# Patient Record
Sex: MALE | Race: WHITE | NOT HISPANIC OR LATINO | Employment: OTHER | URBAN - METROPOLITAN AREA
[De-identification: names, ages, dates, MRNs, and addresses within clinical notes are randomized per-mention and may not be internally consistent; named-entity substitution may affect disease eponyms.]

---

## 2019-08-23 ENCOUNTER — HOSPITAL ENCOUNTER (OUTPATIENT)
Dept: RADIOLOGY | Facility: MEDICAL CENTER | Age: 59
End: 2019-08-23
Attending: NEUROLOGICAL SURGERY
Payer: COMMERCIAL

## 2019-08-23 DIAGNOSIS — M21.379 FOOT-DROP, UNSPECIFIED LATERALITY: ICD-10-CM

## 2019-08-23 DIAGNOSIS — M54.5 LOW BACK PAIN, UNSPECIFIED BACK PAIN LATERALITY, UNSPECIFIED CHRONICITY, WITH SCIATICA PRESENCE UNSPECIFIED: ICD-10-CM

## 2019-08-23 DIAGNOSIS — M54.2 CERVICALGIA: ICD-10-CM

## 2019-08-23 PROCEDURE — 72148 MRI LUMBAR SPINE W/O DYE: CPT

## 2019-08-23 PROCEDURE — 72141 MRI NECK SPINE W/O DYE: CPT

## 2022-09-30 ENCOUNTER — NON-PROVIDER VISIT (OUTPATIENT)
Dept: NEUROLOGY | Facility: MEDICAL CENTER | Age: 62
End: 2022-09-30
Attending: SPECIALIST
Payer: COMMERCIAL

## 2022-09-30 DIAGNOSIS — M62.81 MUSCLE WEAKNESS (GENERALIZED): ICD-10-CM

## 2022-09-30 PROCEDURE — 95886 MUSC TEST DONE W/N TEST COMP: CPT | Performed by: SPECIALIST

## 2022-09-30 PROCEDURE — 95913 NRV CNDJ TEST 13/> STUDIES: CPT | Performed by: SPECIALIST

## 2022-09-30 PROCEDURE — 95913 NRV CNDJ TEST 13/> STUDIES: CPT | Mod: 26 | Performed by: SPECIALIST

## 2022-09-30 PROCEDURE — 95886 MUSC TEST DONE W/N TEST COMP: CPT | Mod: 26 | Performed by: SPECIALIST

## 2022-09-30 NOTE — PROCEDURES
NERVE CONDUCTION STUDIES AND ELECTROMYOGRAPHY REPORT        09/30/22      Referring provider: No primary care provider on file.      SUMMARY OF PATIENT'S CLINICAL HISTORY,PHYSICAL EXAM, AND RATIONALE FOR TESTING:    Mr. Saurav Story Byse 62 y.o. presenting with longstanding history of diabetes and a several year history of weakness in the legs than weakness in the arms associated with decreased sensation.    Past Medical History is significant for : No past medical history on file.    A brief general examination was remarkable for bilateral upper extremity intrinsic hand muscle weakness and atrophy, absent ankle and toe dorsi and plantar flexion bilaterally.  The patient had significant atrophy in his intrinsic foot muscles bilaterally.    The electrodiagnostic studies were performed to evaluate for possible neuropathy versus radiculopathy.      ELECTRODIAGNOSTIC EXAMINATION:  Nerve conduction studies (NCS) and electromyography (EMG) are utilized to evaluate direct or indirect damage to the peripheral nervous system. NCS are performed to measure the nerve(s) response(s) to electrostimulation across a given nerve segment. EMG evaluates the passive and active electrical activity of the muscle(s) in question.  Muscles are innervated by specific peripheral nerves and roots. Often times, several nerves the muscle to be examined in order to determine the presence or absence of the disease process. Furthermore, nerves and muscles may need to be tested in a [id number] comparison, as well as in additional extremities, as this may be crucial in characterizing the extent of the disease process, which may be diffuse or isolated and of varying degree of severity. The extent of the neurodiagnostic exam is justified as it may help arrive to a proper diagnosis, which ultimately may contribute to better management of the patient. Therefore, the nerves to muscles examined during the study were medically necessary.    Unless  otherwise noted, temperature of the extremity(s) study was monitored before and during the examination and remained between 32 and 36 degrees C for the upper extremities, and between 30 and 36 degrees C for the lower extremities.      NERVE CONDUCTION STUDIES:  Sensory nerves:   - Bilateral Median sensory nerves were examined.The responses were not elicitable with antidromic stimuli bilaterally.  - Bilateral Ulnar sensory nerves were examined. The responses were not elicitable with antidromic stimuli bilaterally.  - Bilateral Sural nerves were tested. The responses were not elicitable with antidromic stimuli bilaterally.  -Right radial sensory nerve was studied.  The response was abnormal.  Onset latency was within normal limits but amplitude was decreased to 6.2 µV and conduction velocity was decreased to 33 m/s.    Motor nerves:   - Bilateral Median motor nerves were examined. Recording electrodes placed at the Abductor Pollicis Brevis muscles. The responses were abnormal bilaterally.  The response on the right exhibited a prolonged onset latency at 4.1 ms decreased amplitude of 4.3 mV and slowed conduction velocity at 39 m/s, the response on the right exhibited a prolonged onset latency at 5.5 ms, decreased amplitude at 1.9 mV and slowed conduction velocity at 40 m/s.  - Bilateral Ulnar motor nerves were examined. Recording electrodes placed at the Abductor Digiti Minimi muscles. The responses were abnormal bilaterally.  The response on the left exhibited a prolonged onset latency at 3.4 ms, decreased amplitude of 0.9 mV and slowed conduction velocity at 29 m/s, the response on the right exhibited a normal onset latency with a decreased amplitude of 1.8 mV and slowed conduction velocity of 35 m/s.  - Bilateral Tibial nerves were examined. Recording electrodes placed at the Abductor Hallucis muscles. The responses were not elicitable with proximal or distal stimuli bilaterally.  - Bilateral Deep Peroneal motor  nerves were examined. Recording electrodes were placed at the Extensor Digitorum Brevis muscles.The responses were not elicitable with proximal or distal stimuli bilaterally.  -Bilateral common peroneal motor nerves were examined.  Recording electrodes were placed at the tibialis anterior muscles.  The responses were not elicitable with proximal or distal stimuli bilaterally.        LATE RESPONSES:  F waves were obtained and the following nerves: Left median.  The response was not elicitable.        ELECTROMYOGRAPHY:  The study was performed the concentric needle electrode. Fibrillation and fasciculation activity is graded by convention from none (0) to continuous (4+).  Needle electrode examination was performed in the following muscles: Bilateral deltoid, biceps, triceps, first dorsal interosseous, abductor pollicis brevis.  Acute denervation changes with increased insertional activity and fibrillation potentials were noted in the left tibialis anterior muscle and in the right first dorsal interosseous and abductor pollicis brevis muscle and in the left first dorsal interosseous muscle.  Chronic neuropathic changes with decreased recruitment in many cases of increased amplitude potentials were noted in the vastus lateralis muscles bilaterally.  No recruitable motor units were noted in the bilateral tibialis anterior, gastrocnemius, extensor digitorum brevis, and abductor hallucis muscles.  Reduced recruitment of increased amplitude potentials were noted in the bilateral first dorsal interosseous and abductor pollicis brevis muscles.        Nerve Conduction Studies     Stim Site NR Onset (ms) Norm Onset (ms) O-P Amp (µV) Norm O-P Amp Site1 Site2 Delta-P (ms) Dist (cm) Miguel (m/s) Norm Miguel (m/s)   Left Median Anti Sensory (2nd Digit)   Wrist *NR  <3.8  >10 Wrist 2nd Digit  14.0  >50   Right Median Anti Sensory (2nd Digit)   Wrist *NR  <3.8  >10 Wrist 2nd Digit  14.0  >50   Right Radial Anti Sensory (Base 1st Digit)    Wrist    2.1 <2.8 *6.2 >10 Wrist Base 1st Digit 3.0 10.0 *33 >50       2.7  3.4          Left Sural Anti Sensory (Lat Mall)   Calf *NR  <4.6  >3 Calf Lat Mall  14.0  >40   Right Sural Anti Sensory (Lat Mall)   Calf *NR  <4.6  >3 Calf Lat Mall  14.0  >40   Left Ulnar Anti Sensory (5th Digit)   Wrist *NR  <3.2  >5 Wrist 5th Digit  14.0  >50   Right Ulnar Anti Sensory (5th Digit)   Wrist *NR  <3.2  >5 Wrist 5th Digit  14.0  >50        Stim Site NR Onset (ms) Norm Onset (ms) O-P Amp (mV) Norm O-P Amp Site1 Site2 Delta-0 (ms) Dist (cm) Miguel (m/s) Norm Miguel (m/s)   Left Median Motor (Abd Poll Brev)   Wrist    *4.1 <4 *4.3 >5 Elbow Wrist 6.8 26.5 *39 >50   Elbow    10.9  2.8          Right Median Motor (Abd Poll Brev)   Wrist    *5.5 <4 *1.9 >5 Elbow Wrist 7.2 28.5 *40 >50   Elbow    12.7  1.2          Left Peroneal EDB Motor (Ext Dig Brev)   Ankle *NR  <6  >2.5 B Fib Ankle  0.0  >40   B Fib *NR     Poplt B Fib  0.0     Poplt *NR             Right Peroneal EDB Motor (Ext Dig Brev)   Ankle *NR  <6  >2.5 B Fib Ankle  0.0  >40   B Fib *NR     Poplt B Fib  0.0     Poplt *NR             Left Peroneal TA Motor (AntTibialis)   Fib Head *NR  <4.5  3 Poplit Fib Head  10.0  >40   Poplit *NR             Right Peroneal TA Motor (AntTibialis)   Fib Head *NR  <4.5  3 Poplit Fib Head  10.0  >40   Poplit *NR             Left Tibial Motor (Abd Browning Brev)   Ankle *NR  <6  >4 Knee Ankle  0.0  >40   Knee *NR             Right Tibial Motor (Abd Browning Brev)   Ankle *NR  <6  >4 Knee Ankle  0.0  >40   Knee *NR             Left Ulnar Motor (Abd Dig Min)   Wrist    *3.4 <3.1 *0.9 >7 B Elbow Wrist 7.6 22.0 *29 >50   B Elbow    11.0  0.5  A Elbow B Elbow 3.1 10.0 32    A Elbow    14.1  0.4          Right Ulnar Motor (Abd Dig Min)   Wrist    3.0 <3.1 *1.8 >7 B Elbow Wrist 6.7 23.5 *35 >50   B Elbow    9.7  0.7  A Elbow B Elbow 2.4 10.0 42    A Elbow    12.1  0.0            F Wave Studies     NR F-Lat (ms) Lat Norm (ms)   Left Median (Abd Poll Brev)    *NR  <31                                                   Electromyography     Side Muscle Nerve Root Ins Act Fibs Psw Amp Dur Poly Recrt Int Pat Comment   Right VastusLat Femoral L2-4 Nml Nml Nml *Incr Nml 0 *Reduced *50%    Right AntTibialis Dp Br Fibular L4-5 Nml Nml Nml Nml Nml 0 *NoActivity Nml    Right Gastroc Tibial S1-2 Nml Nml Nml Nml Nml 0 *NoActivity Nml    Right Ext Dig Brev Dp Br Fibular L5, S1 Nml Nml Nml Nml Nml 0 *NoActivity Nml    Right AbdHallucis MedPlantar S1-2 Nml Nml Nml Nml Nml 0 *NoActivity Nml    Left VastusLat Femoral L2-4 Nml Nml Nml *Incr Nml 0 *Reduced *50%    Left AntTibialis Dp Br Fibular L4-5 *Incr *2+ Nml Nml Nml 0 *NoActivity Nml    Left Gastroc Tibial S1-2 Nml Nml Nml Nml Nml 0 *NoActivity Nml    Left Ext Dig Brev Dp Br Fibular L5, S1 Nml Nml Nml Nml Nml 0 *NoActivity Nml    Left AbdHallucis MedPlantar S1-2 Nml Nml Nml Nml Nml 0 *NoActivity Nml    Right Deltoid Axillary C5-6 Nml Nml Nml Nml Nml 0 Nml Nml    Right Biceps Musculocut C5-6 Nml Nml Nml Nml Nml 0 Nml Nml    Right Triceps Radial C6-7-8 Nml Nml Nml Nml Nml 0 Nml Nml    Right 1stDorInt Ulnar C8-T1 *Incr *1+ Nml Nml Nml 0 *Reduced *50%    Right Abd Poll Brev Median C8-T1 *Incr *1+ Nml Nml Nml 0 *Reduced *50%    Left Deltoid Axillary C5-6 Nml Nml Nml Nml Nml 0 Nml Nml    Left Biceps Musculocut C5-6 Nml Nml Nml Nml Nml 0 Nml Nml    Left Triceps Radial C6-7-8 Nml Nml Nml Nml Nml 0 Nml Nml    Left 1stDorInt Ulnar C8-T1 *Incr *1+ Nml *Incr Nml 0 *Reduced *50%    Left Abd Poll Brev Median C8-T1 Nml Nml Nml *Incr Nml 0 *Reduced *75%              DIAGNOSTIC INTERPRETATION:   Extensive electrodiagnostic studies were performed to the bilateral upper and lower extremities.  The results are as follows:    1.  Absent bilateral peroneal motor responses recorded at the tibialis anterior, peroneal motor responses recorded at the extensor digitorum brevis muscle and tibial motor responses.    2.  Decreased amplitude and slowed bilateral  median and ulnar motor responses.    3.  Absent median, ulnar and sural sensory responses and low amplitude right radial sensory response.    4.  Acute denervation changes with fibrillation potentials noted in multiple bilateral distal upper and lower extremity muscles with severe chronic neuropathic changes with no recruitable potentials noted in the bilateral tibialis anterior, gastrocnemius, extensor digitorum brevis and abductor hallucis muscles with reduced recruitment noted in the bilateral ulnar and median muscles.      CLINICAL DISCUSSION:   Taken as a whole these findings are consistent with a mixed axonal/demyelinating, motor/sensory polyneuropathy which is severe electrophysiologically and associated with both acute and chronic neuropathic changes in distal muscles studied in all 4 extremities.  Clinical correlation is suggested.      MARY JO Estes M.D. (No note.)